# Patient Record
Sex: FEMALE | Race: WHITE | URBAN - METROPOLITAN AREA
[De-identification: names, ages, dates, MRNs, and addresses within clinical notes are randomized per-mention and may not be internally consistent; named-entity substitution may affect disease eponyms.]

---

## 2018-01-01 ENCOUNTER — HOSPITAL ENCOUNTER (EMERGENCY)
Facility: CLINIC | Age: 83
End: 2018-04-21
Attending: EMERGENCY MEDICINE | Admitting: EMERGENCY MEDICINE
Payer: MEDICARE

## 2018-01-01 DIAGNOSIS — I46.9 CARDIAC ARREST (H): ICD-10-CM

## 2018-01-01 DIAGNOSIS — K92.0 COFFEE GROUND EMESIS: ICD-10-CM

## 2018-01-01 PROCEDURE — 99281 EMR DPT VST MAYX REQ PHY/QHP: CPT

## 2018-01-01 PROCEDURE — 99285 EMERGENCY DEPT VISIT HI MDM: CPT | Mod: 25

## 2018-01-01 PROCEDURE — 93308 TTE F-UP OR LMTD: CPT

## 2018-01-01 PROCEDURE — 40000256 ZZH STATISTIC CARDIOPULM RESUSCITATION

## 2018-01-01 PROCEDURE — 92950 HEART/LUNG RESUSCITATION CPR: CPT

## 2018-01-01 PROCEDURE — 40000275 ZZH STATISTIC RCP TIME EA 10 MIN

## 2018-01-01 ASSESSMENT — ENCOUNTER SYMPTOMS: VOMITING: 1

## 2018-04-21 NOTE — PROGRESS NOTES
SPIRITUAL HEALTH SERVICES Progress Note  [facility] ED 3  Called to the event of death for pt. Family and friend were there at bedside.  The pt was moved to the comfort room.  Family pura that patient was not very Anglican, neither were the family members present.   I offered a blessing that was appreciated.  The family thanks me and I left letting them know that SH is available to them   And let them know how to contact [facility].  Nothing further at this point.    Claritza Nelson  Chaplain Resident  [phone]

## 2018-04-21 NOTE — ED NOTES
Bed: ED03  Expected date: 4/21/18  Expected time: 10:09 AM  Means of arrival: Ambulance  Comments:  NIMISHA

## 2018-04-21 NOTE — ED PROVIDER NOTES
History     Chief Complaint:  Vomiting and unresponsiveness     HPI:   The history is provided by the EMS personnel.      Kelly Duran is a 85 year old female with a history of CHF and cancer who presents via EMS to the emergency department with family following for evaluation of cardiac arrest. Per EMS, the patient began to vomit around 0900 and went unresponsive, though continued to vomit. Police were contacted and on EMS arrival, around 0930, they had been performing CPR. She was initially in Vfib so was shocked. In total she was shocked two more times and provided 5 doses of Epi. Pulses were reported by one EMS provider as occasionally noted, and by another as never noted. On arrival to the emergency department a josiah device was already applied and on. She was without pulses on arrival. She had been unresponsive for over an hour by time of arrival. The family reports that she has an extensive cardiac history and was currently with cancer. The patient was in town visiting her kids  and other family.   Allergies:  Unable to obtain due to unresponsive patient    Medications:    Unable to obtain     Past Medical History (limited, per family later in stay):    CHF  Cancer     Past Surgical History:    Unable to obtain     Social History:  Patient was visiting from out of town      Review of Systems   Unable to perform ROS: Intubated   Gastrointestinal: Positive for vomiting.     Physical Exam     Physical Exam   General: Elderly female laying flat on stretcher, intubated, Josiah device in place and on  Eyes: Pupils dilated and fixed at 6 mm. Pale conjunctiva. No corneal reflex.   HENT: No palpable scalp hematoma. ET tube in place with RT bagging without resistance. Black vomitus over the face and torso.  CV:  No heart sound ausculated. Pulseless.   Resp: Bagged by RT with bilateral chest rise and bilateral course breath sounds auscultated.   Abd: Soft, nondistended.   Skin: Pale, cold to touch. Puruplish  "discoloration of toes and fingers.   Neuro:  GCS 3 intubated. No corneal reflex. Limp extremities.     Emergency Department Course     Emergency Department Course:  1007: Waiting in stabilization room 3 for patient   1013: Patient arrived with EMS. Josiah device was in place and on. Patient examined.   1015: Family arrived and we spoke about the patient's advanced directives. Family states that she would want to be resuscitated but \"not for a long time or if not going to change anything\".  1017: Bedside US performed with no palpable pulses when holding on CPR. Minimal activity of the heart. Bradycardic rhythm on telemetry (rate of 29) with no palpable pulses in carotid and bilateral femoral regions.  1019: Patient's family prefer to stopping compressions after discussion.  1020: Patient pronounced dead in the emergency department  Discussion with family.  and  contacted.   Impression & Plan      Medical Decision Making:  Kelly Duran is a 85 year old female with a history of cancer who presents to the emergency department with concern of unresponsiveness after vomiting coffee ground emesis. She is visiting family here. She had aggressive resuscitation performed outside of the hospital by EMS where she was never found to be in a perfusing rhythm, per their report. Patient was able to be bagged through the ET tube without difficulty, which seemed to be appropriate placement. There were some confusing notes about possible occasional palpable pulse, but here she definitely had no pulse. Minimal cardiac activity with bradycardic rhythm on monitor that seemed consistent with PEA. She had already received five rounds of epinephrine and ongoing CPR for 30 minutes. Her pupils were dilated and fixed with a negative corneal reflux. There is no evidence that would likely support meaningful return of brain activity even if she did have ROSC the patient was pronounced dead in the emergency department. This " was in full discussion with family members who were in the room. They felt most comfortable with ceasing resuscitation efforts after lengthy discussion.     Critical Care time:  7 minutes     Diagnosis:    ICD-10-CM    1. Cardiac arrest (H) I46.9    2. Coffee ground emesis K92.0        Disposition:   Patient pronounced dead in the emergency department 1020am.    Scribe Disclosure:   Fernando ADORNO, am serving as a scribe at 10:07 AM on 4/21/2018 to document services personally performed by Debo Garza MD based on my observations and the provider's statements to me.       Fernando Pascal  4/21/2018   [facility] EMERGENCY DEPARTMENT       Debo Garza MD  04/22/18 0745